# Patient Record
Sex: MALE | Race: WHITE | NOT HISPANIC OR LATINO | Employment: OTHER | URBAN - METROPOLITAN AREA
[De-identification: names, ages, dates, MRNs, and addresses within clinical notes are randomized per-mention and may not be internally consistent; named-entity substitution may affect disease eponyms.]

---

## 2021-01-23 ENCOUNTER — OFFICE VISIT (OUTPATIENT)
Dept: URBAN - METROPOLITAN AREA CLINIC 68 | Facility: CLINIC | Age: 86
End: 2021-01-23

## 2021-02-13 ENCOUNTER — OFFICE VISIT (OUTPATIENT)
Dept: URBAN - METROPOLITAN AREA CLINIC 68 | Facility: CLINIC | Age: 86
End: 2021-02-13

## 2022-04-05 ENCOUNTER — OFFICE VISIT (OUTPATIENT)
Dept: URBAN - METROPOLITAN AREA CLINIC 68 | Facility: CLINIC | Age: 87
End: 2022-04-05

## 2022-06-04 ENCOUNTER — TELEPHONE ENCOUNTER (OUTPATIENT)
Dept: URBAN - METROPOLITAN AREA CLINIC 68 | Facility: CLINIC | Age: 87
End: 2022-06-04

## 2022-06-04 RX ORDER — ALPRAZOLAM 0.25 MG/1
ALPRAZOLAM( 0.25MG ORAL  AS DIRECTED ) INACTIVE -HX ENTRY TABLET ORAL AS DIRECTED
OUTPATIENT
Start: 2022-04-05

## 2022-06-04 RX ORDER — MIRABEGRON 25 MG/1
MYRBETRIQ( 25MG ORAL  DAILY ) INACTIVE -HX ENTRY TABLET, FILM COATED, EXTENDED RELEASE ORAL DAILY
OUTPATIENT
Start: 2022-04-05

## 2022-06-04 RX ORDER — PROPAFENONE 225 MG/1
PROPAFENONE HCL ER( 225MG ORAL  TWO TIMES DAILY ) INACTIVE -HX ENTRY CAPSULE, EXTENDED RELEASE ORAL
OUTPATIENT
Start: 2016-10-19

## 2022-06-04 RX ORDER — LOSARTAN POTASSIUM 25 MG/1
LOSARTAN POTASSIUM( 25MG ORAL  DAILY ) INACTIVE -HX ENTRY TABLET ORAL DAILY
OUTPATIENT
Start: 2022-04-05

## 2022-06-04 RX ORDER — ZOLPIDEM TARTRATE 5 MG/1
ZOLPIDEM TARTRATE( 5MG ORAL  AS DIRECTED ) INACTIVE -HX ENTRY TABLET, FILM COATED ORAL AS DIRECTED
OUTPATIENT
Start: 2022-04-05

## 2022-06-04 RX ORDER — WARFARIN 5 MG/1
WARFARIN SODIUM( 5MG ORAL  DAILY ) INACTIVE -HX ENTRY TABLET ORAL DAILY
OUTPATIENT
Start: 2022-04-05

## 2022-06-04 RX ORDER — TRIAMTERENE AND HYDROCHLOROTHIAZIDE 37.5; 25 MG/1; MG/1
TRIAMTERENE-HCTZ( 37.5-25MG ORAL  DAILY ) INACTIVE -HX ENTRY CAPSULE ORAL DAILY
OUTPATIENT
Start: 2019-02-06

## 2022-06-05 ENCOUNTER — TELEPHONE ENCOUNTER (OUTPATIENT)
Dept: URBAN - METROPOLITAN AREA CLINIC 68 | Facility: CLINIC | Age: 87
End: 2022-06-05

## 2022-06-05 RX ORDER — UMECLIDINIUM BROMIDE AND VILANTEROL TRIFENATATE 62.5; 25 UG/1; UG/1
ANORO ELLIPTA( 62.5-25MCG/INH INHALATION   ) ACTIVE -HX ENTRY POWDER RESPIRATORY (INHALATION)
Status: ACTIVE | COMMUNITY
Start: 2022-04-05

## 2022-06-05 RX ORDER — FUROSEMIDE 40 MG/1
FUROSEMIDE( 40MG ORAL 1 DAILY ) ACTIVE -HX ENTRY TABLET ORAL DAILY
Status: ACTIVE | COMMUNITY
Start: 2022-04-05

## 2022-06-05 RX ORDER — B-COMPLEX WITH VITAMIN C
VITAMIN B COMPLEX(  ORAL  DAILY ) ACTIVE -HX ENTRY TABLET ORAL DAILY
Status: ACTIVE | COMMUNITY
Start: 2022-04-05

## 2022-06-05 RX ORDER — HYDROCHLOROTHIAZIDE 25 MG/1
HYDROCHLOROTHIAZIDE( 25MG ORAL  DAILY ) ACTIVE -HX ENTRY TABLET ORAL DAILY
Status: ACTIVE | COMMUNITY
Start: 2022-04-05

## 2022-06-05 RX ORDER — RIVAROXABAN 15 MG/1
XARELTO( 15MG ORAL 1 DAILY ) ACTIVE -HX ENTRY TABLET, FILM COATED ORAL DAILY
Status: ACTIVE | COMMUNITY
Start: 2022-04-05

## 2022-06-05 RX ORDER — METOPROLOL TARTRATE 25 MG/1
METOPROLOL TARTRATE( 25MG ORAL 1 DAILY ) ACTIVE -HX ENTRY TABLET, FILM COATED ORAL DAILY
Status: ACTIVE | COMMUNITY
Start: 2022-04-05

## 2022-06-25 ENCOUNTER — TELEPHONE ENCOUNTER (OUTPATIENT)
Age: 87
End: 2022-06-25

## 2022-06-25 RX ORDER — TRIAMTERENE AND HYDROCHLOROTHIAZIDE 37.5; 25 MG/1; MG/1
TRIAMTERENE-HCTZ( 37.5-25MG ORAL  DAILY ) INACTIVE -HX ENTRY CAPSULE ORAL DAILY
OUTPATIENT
Start: 2019-02-06

## 2022-06-25 RX ORDER — ALPRAZOLAM 0.25 MG/1
ALPRAZOLAM( 0.25MG ORAL  AS DIRECTED ) INACTIVE -HX ENTRY TABLET ORAL AS DIRECTED
OUTPATIENT
Start: 2022-04-05

## 2022-06-25 RX ORDER — LOSARTAN POTASSIUM 25 MG/1
LOSARTAN POTASSIUM( 25MG ORAL  DAILY ) INACTIVE -HX ENTRY TABLET, FILM COATED ORAL DAILY
OUTPATIENT
Start: 2022-04-05

## 2022-06-25 RX ORDER — PROPAFENONE 225 MG/1
PROPAFENONE HCL ER( 225MG ORAL  TWO TIMES DAILY ) INACTIVE -HX ENTRY CAPSULE, EXTENDED RELEASE ORAL
OUTPATIENT
Start: 2016-10-19

## 2022-06-25 RX ORDER — SODIUM SULFATE, POTASSIUM SULFATE, MAGNESIUM SULFATE 17.5; 3.13; 1.6 G/ML; G/ML; G/ML
SOLUTION, CONCENTRATE ORAL AS DIRECTED
Qty: 1 | Refills: 0 | OUTPATIENT
Start: 2016-10-19 | End: 2016-10-20

## 2022-06-25 RX ORDER — MIRABEGRON 25 MG/1
MYRBETRIQ( 25MG ORAL  DAILY ) INACTIVE -HX ENTRY TABLET, FILM COATED, EXTENDED RELEASE ORAL DAILY
OUTPATIENT
Start: 2022-04-05

## 2022-06-25 RX ORDER — WARFARIN 5 MG/1
WARFARIN SODIUM( 5MG ORAL  DAILY ) INACTIVE -HX ENTRY TABLET ORAL DAILY
OUTPATIENT
Start: 2022-04-05

## 2022-06-25 RX ORDER — ZOLPIDEM TARTRATE 5 MG/1
ZOLPIDEM TARTRATE( 5MG ORAL  AS DIRECTED ) INACTIVE -HX ENTRY TABLET ORAL AS DIRECTED
OUTPATIENT
Start: 2022-04-05

## 2022-06-26 ENCOUNTER — TELEPHONE ENCOUNTER (OUTPATIENT)
Age: 87
End: 2022-06-26

## 2022-06-26 RX ORDER — METOPROLOL TARTRATE 25 MG/1
METOPROLOL TARTRATE( 25MG ORAL 1 DAILY ) ACTIVE -HX ENTRY TABLET, FILM COATED ORAL DAILY
Status: ACTIVE | COMMUNITY
Start: 2022-04-05

## 2022-06-26 RX ORDER — B-COMPLEX WITH VITAMIN C
VITAMIN B COMPLEX(  ORAL  DAILY ) ACTIVE -HX ENTRY TABLET ORAL DAILY
Status: ACTIVE | COMMUNITY
Start: 2022-04-05

## 2022-06-26 RX ORDER — FUROSEMIDE 40 MG/1
FUROSEMIDE( 40MG ORAL 1 DAILY ) ACTIVE -HX ENTRY TABLET ORAL DAILY
Status: ACTIVE | COMMUNITY
Start: 2022-04-05

## 2022-06-26 RX ORDER — UMECLIDINIUM BROMIDE AND VILANTEROL TRIFENATATE 62.5; 25 UG/1; UG/1
ANORO ELLIPTA( 62.5-25MCG/INH INHALATION   ) ACTIVE -HX ENTRY POWDER RESPIRATORY (INHALATION)
Status: ACTIVE | COMMUNITY
Start: 2022-04-05

## 2022-06-26 RX ORDER — UBIDECARENONE/VIT E ACET 100MG-5
CO Q 10( 100MG ORAL  DAILY ) ACTIVE -HX ENTRY CAPSULE ORAL DAILY
Status: ACTIVE | COMMUNITY
Start: 2022-04-05

## 2022-06-26 RX ORDER — HYDROCHLOROTHIAZIDE 25 MG/1
HYDROCHLOROTHIAZIDE( 25MG ORAL  DAILY ) ACTIVE -HX ENTRY TABLET ORAL DAILY
Status: ACTIVE | COMMUNITY
Start: 2022-04-05

## 2022-06-26 RX ORDER — RIVAROXABAN 15 MG/1
XARELTO( 15MG ORAL 1 DAILY ) ACTIVE -HX ENTRY TABLET, FILM COATED ORAL DAILY
Status: ACTIVE | COMMUNITY
Start: 2022-04-05

## 2022-06-26 RX ORDER — ALUMINUM ZIRCONIUM TETRACHLOROHYDREX GLY 0.18 G/G
METAMUCIL CLEAR & NATURAL(  ORAL 3 TABS DAILY ) ACTIVE -HX ENTRY STICK TOPICAL DAILY
Status: ACTIVE | COMMUNITY
Start: 2022-04-05

## 2023-01-01 ENCOUNTER — TELEPHONE ENCOUNTER (OUTPATIENT)
Dept: URBAN - METROPOLITAN AREA CLINIC 68 | Facility: CLINIC | Age: 88
End: 2023-01-01

## 2023-01-01 ENCOUNTER — OFFICE VISIT (OUTPATIENT)
Dept: URBAN - METROPOLITAN AREA CLINIC 68 | Facility: CLINIC | Age: 88
End: 2023-01-01

## 2023-01-01 ENCOUNTER — OFFICE VISIT (OUTPATIENT)
Dept: URBAN - METROPOLITAN AREA SURGERY CENTER 12 | Facility: SURGERY CENTER | Age: 88
End: 2023-01-01

## 2023-01-01 ENCOUNTER — DASHBOARD ENCOUNTERS (OUTPATIENT)
Age: 88
End: 2023-01-01

## 2023-01-01 RX ORDER — UMECLIDINIUM BROMIDE AND VILANTEROL TRIFENATATE 62.5; 25 UG/1; UG/1
ANORO ELLIPTA( 62.5-25MCG/INH INHALATION   ) ACTIVE -HX ENTRY POWDER RESPIRATORY (INHALATION)
Status: ACTIVE | COMMUNITY
Start: 2022-04-05

## 2023-01-01 RX ORDER — ALUMINUM ZIRCONIUM TETRACHLOROHYDREX GLY 0.18 G/G
METAMUCIL CLEAR & NATURAL(  ORAL 3 TABS DAILY ) ACTIVE -HX ENTRY STICK TOPICAL DAILY
Status: ACTIVE | COMMUNITY
Start: 2022-04-05

## 2023-01-01 RX ORDER — ALUMINUM ZIRCONIUM TETRACHLOROHYDREX GLY 0.18 G/G
METAMUCIL CLEAR & NATURAL(  ORAL 3 TABS DAILY ) ACTIVE -HX ENTRY STICK TOPICAL DAILY
OUTPATIENT
Start: 2022-04-05

## 2023-01-01 RX ORDER — ALLOPURINOL 100 MG/1
1 TABLET TABLET ORAL ONCE A DAY
Status: ACTIVE | COMMUNITY

## 2023-01-01 RX ORDER — FUROSEMIDE 40 MG/1
FUROSEMIDE( 40MG ORAL 1 DAILY ) ACTIVE -HX ENTRY TABLET ORAL DAILY
Status: ACTIVE | COMMUNITY
Start: 2022-04-05

## 2023-01-01 RX ORDER — SACUBITRIL AND VALSARTAN 24; 26 MG/1; MG/1
1 TABLET TABLET, FILM COATED ORAL TWICE A DAY
Status: ACTIVE | COMMUNITY

## 2023-01-01 RX ORDER — RIVAROXABAN 15 MG/1
XARELTO( 15MG ORAL 1 DAILY ) ACTIVE -HX ENTRY TABLET, FILM COATED ORAL DAILY
Status: ACTIVE | COMMUNITY
Start: 2022-04-05

## 2023-01-01 RX ORDER — LACTASE 9000 UNIT
1 TABLET WITH FIRST BITE OF DAIRY - CONTAINING FOOD TABLET ORAL THREE TIMES A DAY
Qty: 90 TABLET | Refills: 10 | OUTPATIENT

## 2023-01-01 RX ORDER — EZETIMIBE 10 MG/1
1 TABLET TABLET ORAL ONCE A DAY
Status: ACTIVE | COMMUNITY

## 2023-01-01 RX ORDER — LACTASE 9000 UNIT
1 TABLET WITH FIRST BITE OF DAIRY - CONTAINING FOOD TABLET ORAL THREE TIMES A DAY
Qty: 90 TABLET | Refills: 10 | Status: ACTIVE | COMMUNITY
Start: 2023-01-01 | End: 2024-01-18

## 2023-01-01 RX ORDER — BUDESONIDE 3 MG/1
3 CAPSULES CAPSULE ORAL ONCE A DAY
Qty: 84 CAPSULE | Refills: 1 | OUTPATIENT
Start: 2023-01-01

## 2023-01-01 RX ORDER — LACTASE 9000 UNIT
1 TABLET WITH FIRST BITE OF DAIRY - CONTAINING FOOD TABLET ORAL THREE TIMES A DAY
Qty: 90 TABLET | Refills: 10 | OUTPATIENT
Start: 2023-01-01 | End: 2024-01-18

## 2023-02-10 NOTE — HPI-MIGRATED HPI
General : 2 months of diarrhea, new, urgent and loose, 4-5 daily, some nearly incontinent, with mucous and blood  Metamucil Antibiotics one month ago for dental procedure, amoxicillin

## 2023-02-22 NOTE — HPI-MIGRATED HPI
General : 2/22  persistant symptoms , 4 BmS Pt has history of afib , no symptoms , on Xarelto   Fl\ynn, last eval 6 months  PREVIOUS  2 months of diarrhea, new, urgent and loose, 4-5 daily, some nearly incontinent, with mucous and blood  Metamucil Antibiotics one month ago for dental procedure, amoxicillin

## 2023-04-05 NOTE — HPI-MIGRATED HPI
General : 4/5  still with loose stool twice daily , improved on metamucil form 4-5, but still normal   Colonscopy  revealled limited exam due to aspiration risk and retching, BX POS lymphocytic colits 2/22  persistant symptoms , 4 BmS Pt has history of afib , no symptoms , on Xarelto  Dr Fl\ynn, last eval 6 months  PREVIOUS  2 months of diarrhea, new, urgent and loose, 4-5 daily, some nearly incontinent, with mucous and blood  Metamucil Antibiotics one month ago for dental procedure, amoxicillin

## 2023-10-27 ENCOUNTER — OFFICE VISIT (OUTPATIENT)
Dept: URBAN - METROPOLITAN AREA CLINIC 68 | Facility: CLINIC | Age: 88
End: 2023-10-27